# Patient Record
Sex: FEMALE | Race: WHITE | URBAN - METROPOLITAN AREA
[De-identification: names, ages, dates, MRNs, and addresses within clinical notes are randomized per-mention and may not be internally consistent; named-entity substitution may affect disease eponyms.]

---

## 2024-03-21 ENCOUNTER — OFFICE VISIT (OUTPATIENT)
Dept: OTOLARYNGOLOGY | Facility: CLINIC | Age: 49
End: 2024-03-21

## 2024-03-21 VITALS — BODY MASS INDEX: 27.38 KG/M2 | WEIGHT: 145 LBS | HEIGHT: 61 IN

## 2024-03-21 DIAGNOSIS — H92.12 OTORRHEA OF LEFT EAR: ICD-10-CM

## 2024-03-21 DIAGNOSIS — H90.12 CONDUCTIVE HEARING LOSS OF LEFT EAR WITH UNRESTRICTED HEARING OF RIGHT EAR: ICD-10-CM

## 2024-03-21 DIAGNOSIS — H60.392 OTHER INFECTIVE ACUTE OTITIS EXTERNA OF LEFT EAR: Primary | ICD-10-CM

## 2024-03-21 PROCEDURE — 99203 OFFICE O/P NEW LOW 30 MIN: CPT | Performed by: STUDENT IN AN ORGANIZED HEALTH CARE EDUCATION/TRAINING PROGRAM

## 2024-03-21 PROCEDURE — 3008F BODY MASS INDEX DOCD: CPT | Performed by: STUDENT IN AN ORGANIZED HEALTH CARE EDUCATION/TRAINING PROGRAM

## 2024-03-21 RX ORDER — LORATADINE 10 MG/1
10 TABLET ORAL DAILY
COMMUNITY
Start: 2024-01-15

## 2024-03-21 RX ORDER — ATORVASTATIN CALCIUM 20 MG/1
1 TABLET, FILM COATED ORAL DAILY
COMMUNITY
Start: 2023-02-15

## 2024-03-21 RX ORDER — SODIUM FLUORIDE 5 MG/G
CREAM DENTAL
COMMUNITY
Start: 2023-11-06

## 2024-03-21 RX ORDER — LANCETS 33 GAUGE
1 EACH MISCELLANEOUS 2 TIMES DAILY
COMMUNITY
Start: 2024-01-30

## 2024-03-21 RX ORDER — BLOOD SUGAR DIAGNOSTIC
STRIP MISCELLANEOUS 2 TIMES DAILY
COMMUNITY

## 2024-03-21 RX ORDER — DULAGLUTIDE 1.5 MG/.5ML
1.5 INJECTION, SOLUTION SUBCUTANEOUS WEEKLY
COMMUNITY

## 2024-03-21 RX ORDER — MONTELUKAST SODIUM 10 MG/1
10 TABLET ORAL DAILY
COMMUNITY
Start: 2024-01-11

## 2024-03-21 RX ORDER — CHOLECALCIFEROL (VITAMIN D3) 1250 MCG
50000 CAPSULE ORAL WEEKLY
COMMUNITY

## 2024-03-21 RX ORDER — TERCONAZOLE 80 MG/1
SUPPOSITORY VAGINAL
COMMUNITY
Start: 2023-11-10

## 2024-03-21 RX ORDER — BLOOD SUGAR DIAGNOSTIC
1 STRIP MISCELLANEOUS 2 TIMES DAILY
COMMUNITY

## 2024-03-21 RX ORDER — ESCITALOPRAM OXALATE 10 MG/1
1 TABLET ORAL DAILY
COMMUNITY
Start: 2023-02-15

## 2024-03-21 RX ORDER — SITAGLIPTIN 100 MG/1
TABLET, FILM COATED ORAL
COMMUNITY
Start: 2023-10-27

## 2024-03-21 RX ORDER — CIPROFLOXACIN AND DEXAMETHASONE 3; 1 MG/ML; MG/ML
4 SUSPENSION/ DROPS AURICULAR (OTIC) EVERY 12 HOURS
Qty: 7.5 ML | Refills: 0 | Status: SHIPPED | OUTPATIENT
Start: 2024-03-21 | End: 2024-03-28

## 2024-03-21 RX ORDER — SULFAMETHOXAZOLE AND TRIMETHOPRIM 800; 160 MG/1; MG/1
1 TABLET ORAL DAILY
COMMUNITY
Start: 2024-01-15

## 2024-03-21 RX ORDER — FLUCONAZOLE 150 MG/1
TABLET ORAL
COMMUNITY
Start: 2023-11-10

## 2024-03-21 RX ORDER — LOSARTAN POTASSIUM 25 MG/1
1 TABLET ORAL DAILY
COMMUNITY
Start: 2023-02-15

## 2024-03-21 NOTE — PROGRESS NOTES
[place]  OTOLARYNGOLOGY - HEAD & NECK SURGERY    3/21/2024     Reason for Consultation:   Left ear swelling, drainage for 1 month    History of Present Illness:   Patient is a pleasant 48 year old female who is being seen for left sided discomfort and drainage for 1 month. She has had this in the past and required cleaning without antibiotics.  She states this has been persistent over the last month and causes it to be severely itchy in the left ear canal.  He has not utilized any drops recently.  She does not endorse any significant pain but does note some discomfort.  She does have an impact of her hearing on the left side due to the fullness in her ear canal.  No history of ear surgery    Past Medical History  History reviewed. No pertinent past medical history.    Past Surgical History  History reviewed. No pertinent surgical history.    Family History  Family History   Problem Relation Age of Onset    High Blood Pressure Father     Diabetes Father        Social History  Pediatric History   Patient Parents    Not on file     Other Topics Concern    Not on file   Social History Narrative    Not on file           Current Medications:  Current Outpatient Medications   Medication Sig Dispense Refill    atorvastatin 20 MG Oral Tab Take 1 tablet (20 mg total) by mouth daily.      VITAMIN D3 1.25 MG (87480 UT) Oral Cap Take 1 capsule (50,000 Units total) by mouth once a week.      TRULICITY 1.5 MG/0.5ML Subcutaneous Solution Pen-injector Inject 1.5 mg into the skin once a week.      escitalopram 10 MG Oral Tab Take 1 tablet (10 mg total) by mouth daily.      fluconazole 150 MG Oral Tab TAKE 1 TABLET BY MOUTH STAT THEN 1 TABLET BY MOUTH IN TWO WEEKS      Glucose Blood (ONETOUCH VERIO) In Vitro Strip 1 strip by In Vitro route 2 (two) times daily.      Glucose Blood (ONETOUCH VERIO) In Vitro Strip 2 (two) times daily.      Lancets (ONETOUCH DELICA PLUS [id number]) Does not apply Misc 1 strip by In Vitro route 2 (two)  times daily.      loratadine 10 MG Oral Tab Take 1 tablet (10 mg total) by mouth daily.      losartan 25 MG Oral Tab Take 1 tablet (25 mg total) by mouth daily.      metFORMIN HCl 1000 MG Oral Tab       montelukast 10 MG Oral Tab Take 1 tablet (10 mg total) by mouth daily.      JANUVIA 100 MG Oral Tab       SODIUM FLUORIDE 5000 PLUS 1.1 % Dental Cream APPLY A SMALL AMOUNT TO TEETH EVERY NIGHT AS DIRECTED. DO NOT SWALLOW. DO NOT EAT OR DRINK FOR 30 MINUTES AFTER USE      sulfamethoxazole-trimethoprim -160 MG Oral Tab per tablet Take 1 tablet by mouth daily.      Terconazole 80 MG Vaginal Suppos          Allergies  No Known Allergies    Review of Systems:   A comprehensive 10 point review of systems was completed.  Pertinent positives and negatives noted in the the HPI.    Physical Exam:   Height 5' 1\" (1.549 m), weight 145 lb (65.8 kg).    GENERAL: No acute distress, Comfortable appearing  FACE: HB 1/6, Normal Animation  HEAD: Normocephalic  EYES: EOMI, pupils equil  EARS: Bilateral Auricles Symmetric   NOSE: Nares patent bilaterally  ORAL CAVITY: Tongue mobile, Oropharynx clear, Floor of mouth clear, Posterior oropharynx normal  NECK: No palpable lymphadenopathy, thyroid not palpable, nontender    Canals:  Right: Clear  Left: Significant edema of the lateral canal with some purulence    Tympanic Membranes:  Right: Normal tympanic membrane, with no retraction, middle ear space clear  Left: Unable to be visualized    TM Visualized Method:   Right TM examined via otomicroscopy.   Left TM unable to be visualized      Results:     Laboratory Data:  No results found for: \"WBC\", \"HGB\", \"HCT\", \"PLT\", \"CREATSERUM\", \"BUN\", \"NA\", \"K\", \"CL\", \"CO2\", \"GLU\", \"CA\", \"ALB\", \"ALKPHO\", \"TP\", \"AST\", \"ALT\", \"PTT\", \"INR\", \"PTP\", \"T4F\", \"TSH\", \"TSHREFLEX\", \"MARY\", \"LIP\", \"GGT\", \"PSA\", \"DDIMER\", \"ESRML\", \"ESRPF\", \"CRP\", \"BNP\", \"MG\", \"PHOS\", \"TROP\", \"CK\", \"CKMB\", \"MIKIE\", \"RPR\", \"B12\", \"ETOH\", \"POCGLU\"      Imaging:  No results  found.      Impression:   Left otitis externa, other infective  Left otorrhea  Left conductive hearing loss    Recommendations:  I have placed an otowick in the left ear along with ofloxacin eardrops  I will have her take Ciprodex eardrops twice a day over the next week  She will return in 1 week for removal of the wick and debridement of the canal    Thank you for allowing me to participate in the care of your patient.    Ramirez Cordova,    Otolaryngology/Rhinology, Sinus, and Endoscopic Skull Base Surgery  [place] [id number]  Phone [phone]  Fax [phone]  3/21/2024  10:19 AM  3/21/2024

## 2024-03-28 ENCOUNTER — OFFICE VISIT (OUTPATIENT)
Dept: OTOLARYNGOLOGY | Facility: CLINIC | Age: 49
End: 2024-03-28

## 2024-03-28 DIAGNOSIS — H92.12 OTORRHEA OF LEFT EAR: ICD-10-CM

## 2024-03-28 DIAGNOSIS — H60.392 OTHER INFECTIVE ACUTE OTITIS EXTERNA OF LEFT EAR: Primary | ICD-10-CM

## 2024-03-28 PROCEDURE — 99213 OFFICE O/P EST LOW 20 MIN: CPT | Performed by: STUDENT IN AN ORGANIZED HEALTH CARE EDUCATION/TRAINING PROGRAM

## 2024-03-28 PROCEDURE — 69210 REMOVE IMPACTED EAR WAX UNI: CPT | Performed by: STUDENT IN AN ORGANIZED HEALTH CARE EDUCATION/TRAINING PROGRAM

## 2024-03-28 NOTE — PROGRESS NOTES
[place]  OTOLARYNGOLOGY - HEAD & NECK SURGERY    3/28/2024     Reason for Consultation:   Left ear swelling, drainage for 1 month    History of Present Illness:   Patient is a pleasant 48 year old female who is being seen for left sided discomfort and drainage for 1 month. She has had this in the past and required cleaning without antibiotics.  She states this has been persistent over the last month and causes it to be severely itchy in the left ear canal.  He has not utilized any drops recently.  She does not endorse any significant pain but does note some discomfort.  She does have an impact of her hearing on the left side due to the fullness in her ear canal.  No history of ear surgery    INTERVAL HISTORY  3/28/2024:     Past Medical History  History reviewed. No pertinent past medical history.    Past Surgical History  History reviewed. No pertinent surgical history.    Family History  Family History   Problem Relation Age of Onset    High Blood Pressure Father     Diabetes Father        Social History  Pediatric History   Patient Parents    Not on file     Other Topics Concern    Not on file   Social History Narrative    Not on file           Current Medications:  Current Outpatient Medications   Medication Sig Dispense Refill    atorvastatin 20 MG Oral Tab Take 1 tablet (20 mg total) by mouth daily.      VITAMIN D3 1.25 MG (11351 UT) Oral Cap Take 1 capsule (50,000 Units total) by mouth once a week.      TRULICITY 1.5 MG/0.5ML Subcutaneous Solution Pen-injector Inject 1.5 mg into the skin once a week.      escitalopram 10 MG Oral Tab Take 1 tablet (10 mg total) by mouth daily.      fluconazole 150 MG Oral Tab TAKE 1 TABLET BY MOUTH STAT THEN 1 TABLET BY MOUTH IN TWO WEEKS      Glucose Blood (ONETOUCH VERIO) In Vitro Strip 1 strip by In Vitro route 2 (two) times daily.      Glucose Blood (ONETOUCH VERIO) In Vitro Strip 2 (two) times daily.      Lancets (ONETOUCH DELICA PLUS [id number]) Does not apply Misc 1  strip by In Vitro route 2 (two) times daily.      loratadine 10 MG Oral Tab Take 1 tablet (10 mg total) by mouth daily.      losartan 25 MG Oral Tab Take 1 tablet (25 mg total) by mouth daily.      metFORMIN HCl 1000 MG Oral Tab       montelukast 10 MG Oral Tab Take 1 tablet (10 mg total) by mouth daily.      JANUVIA 100 MG Oral Tab       SODIUM FLUORIDE 5000 PLUS 1.1 % Dental Cream APPLY A SMALL AMOUNT TO TEETH EVERY NIGHT AS DIRECTED. DO NOT SWALLOW. DO NOT EAT OR DRINK FOR 30 MINUTES AFTER USE      sulfamethoxazole-trimethoprim -160 MG Oral Tab per tablet Take 1 tablet by mouth daily.      Terconazole 80 MG Vaginal Suppos       ciprofloxacin-dexamethasone 0.3-0.1 % Otic Suspension Place 4 drops into the left ear every 12 (twelve) hours for 7 days. 7.5 mL 0       Allergies  No Known Allergies    Review of Systems:   A comprehensive 10 point review of systems was completed.  Pertinent positives and negatives noted in the the HPI.    Physical Exam:   There were no vitals taken for this visit.    GENERAL: No acute distress, Comfortable appearing  FACE: HB 1/6, Normal Animation  HEAD: Normocephalic  EYES: EOMI, pupils equil  EARS: Bilateral Auricles Symmetric   NOSE: Nares patent bilaterally  ORAL CAVITY: Tongue mobile, Oropharynx clear, Floor of mouth clear, Posterior oropharynx normal  NECK: No palpable lymphadenopathy, thyroid not palpable, nontender    Canals:  Right: Clear  Left: Debris and cerumen impaction removed using suction in order to visualize the tympanic membrane, there is resolution of the canal edema    Tympanic Membranes:  Right: Normal tympanic membrane, with no retraction, middle ear space clear  Left: Normal tympanic membrane, no retraction    TM Visualized Method:   Right TM examined via otomicroscopy.   Left TM examined via otomicroscopy      Results:     Laboratory Data:  No results found for: \"WBC\", \"HGB\", \"HCT\", \"PLT\", \"CREATSERUM\", \"BUN\", \"NA\", \"K\", \"CL\", \"CO2\", \"GLU\", \"CA\", \"ALB\",  \"ALKPHO\", \"TP\", \"AST\", \"ALT\", \"PTT\", \"INR\", \"PTP\", \"T4F\", \"TSH\", \"TSHREFLEX\", \"MARY\", \"LIP\", \"GGT\", \"PSA\", \"DDIMER\", \"ESRML\", \"ESRPF\", \"CRP\", \"BNP\", \"MG\", \"PHOS\", \"TROP\", \"CK\", \"CKMB\", \"MIKIE\", \"RPR\", \"B12\", \"ETOH\", \"POCGLU\"      Imaging:  No results found.      Impression:   Left otitis externa, other infective  Left otorrhea  Left conductive hearing loss    Recommendations:  Patient is much improved today in terms of her ear  She is able to hear normally  She will return to see me if there are any persistent issues    Thank you for allowing me to participate in the care of your patient.    Ramirez Cordova, DO   Otolaryngology/Rhinology, Sinus, and Endoscopic Skull Base Surgery  [facility]   [address] [place] [address] [address] [place] [id number]  Phone [phone]  Fax [phone]  3/21/2024  10:19 AM  3/28/2024

## 2024-09-30 ENCOUNTER — OFFICE VISIT (OUTPATIENT)
Dept: OTOLARYNGOLOGY | Facility: CLINIC | Age: 49
End: 2024-09-30

## 2024-09-30 VITALS — BODY MASS INDEX: 27.38 KG/M2 | WEIGHT: 145 LBS | HEIGHT: 61 IN

## 2024-09-30 DIAGNOSIS — H61.22 IMPACTED CERUMEN OF LEFT EAR: Primary | ICD-10-CM

## 2024-09-30 PROCEDURE — 69210 REMOVE IMPACTED EAR WAX UNI: CPT | Performed by: OTOLARYNGOLOGY

## 2024-09-30 PROCEDURE — 3008F BODY MASS INDEX DOCD: CPT | Performed by: OTOLARYNGOLOGY

## 2024-09-30 NOTE — PROGRESS NOTES
Kenisha Rivera is a 49 year old female.    Chief Complaint   Patient presents with    Ear Problem     Left ear still ear still very itchy, feels wet at times        HISTORY OF PRESENT ILLNESS    Patient presents for cerumen removal. No other complaints or concerns at this time    Social History     Socioeconomic History    Marital status:    Tobacco Use    Smoking status: Every Day     Types: Cigarettes    Smokeless tobacco: Never   Vaping Use    Vaping status: Every Day    Substances: Nicotine   Substance and Sexual Activity    Alcohol use: Yes    Drug use: Never       Family History   Problem Relation Age of Onset    High Blood Pressure Father     Diabetes Father        History reviewed. No pertinent past medical history.    History reviewed. No pertinent surgical history.    REVIEW OF SYSTEMS    System Neg/Pos Details   Constitutional Negative Fatigue, fever and weight loss.   ENMT Negative Drooling.   Eyes Negative Blurred vision and vision changes.   Respiratory Negative Dyspnea and wheezing.   Cardio Negative Chest pain, irregular heartbeat/palpitations and syncope.   GI Negative Abdominal pain and diarrhea.   Endocrine Negative Cold intolerance and heat intolerance.   Neuro Negative Tremors.   Psych Negative Anxiety and depression.   Integumentary Negative Frequent skin infections, pigment change and rash.   Hema/Lymph Negative Easy bleeding and easy bruising.           PHYSICAL EXAM    Ht 5' 1\" (1.549 m)   Wt 145 lb (65.8 kg)   BMI 27.40 kg/m²        Constitutional Normal Overall appearance - Normal.        Neck Exam Normal Inspection - Normal. Palpation - Normal. Parotid gland - Normal. Thyroid gland - Normal.             Head/Face Normal Facial features - Normal. Eyebrows - Normal. Skull - Normal.             Ears Normal Inspection - Right: Normal, Left: Normal. Canal - Right: Normal, Left: Normal. TM - Right: Normal, Left: Normal.   Skin Normal Inspection - Normal.                               Canals:  Right: Canal reveals  no cerumen impaction,   Left: Canal reveals cerumen impaction,     Tympanic Membranes:  Right: Normal tympanic membrane.   Left: Normal tympanic membrane.     TM Visualized Method:   Right TM examined via otomicroscopy.    Left TM examined via otomicroscopy.      PROCEDURE:    Removal of cerumen impaction   The cerumen impaction was completely removed using microscopy.   Removal was completed by using acurette and/or suction.   Comments: Return to clinic as needed.  Avoid q-tips, water precautions and use over the counter wax remedies as needed.      Current Outpatient Medications:     atorvastatin 20 MG Oral Tab, Take 1 tablet (20 mg total) by mouth daily., Disp: , Rfl:     TRULICITY 1.5 MG/0.5ML Subcutaneous Solution Pen-injector, Inject 1.5 mg into the skin once a week., Disp: , Rfl:     escitalopram 10 MG Oral Tab, Take 1 tablet (10 mg total) by mouth daily., Disp: , Rfl:     Glucose Blood (ONETOUCH VERIO) In Vitro Strip, 1 strip by In Vitro route 2 (two) times daily., Disp: , Rfl:     Glucose Blood (ONETOUCH VERIO) In Vitro Strip, 2 (two) times daily., Disp: , Rfl:     Lancets (ONETOUCH DELICA PLUS [id number]) Does not apply Misc, 1 strip by In Vitro route 2 (two) times daily., Disp: , Rfl:     loratadine 10 MG Oral Tab, Take 1 tablet (10 mg total) by mouth daily., Disp: , Rfl:     losartan 25 MG Oral Tab, Take 1 tablet (25 mg total) by mouth daily., Disp: , Rfl:     metFORMIN HCl 1000 MG Oral Tab, , Disp: , Rfl:     montelukast 10 MG Oral Tab, Take 1 tablet (10 mg total) by mouth daily., Disp: , Rfl:     JANUVIA 100 MG Oral Tab, , Disp: , Rfl:     SODIUM FLUORIDE 5000 PLUS 1.1 % Dental Cream, APPLY A SMALL AMOUNT TO TEETH EVERY NIGHT AS DIRECTED. DO NOT SWALLOW. DO NOT EAT OR DRINK FOR 30 MINUTES AFTER USE, Disp: , Rfl:     VITAMIN D3 1.25 MG (62667 UT) Oral Cap, Take 1 capsule (50,000 Units total) by mouth once a week. (Patient not taking: Reported on 9/30/2024), Disp: , Rfl:      fluconazole 150 MG Oral Tab, TAKE 1 TABLET BY MOUTH STAT THEN 1 TABLET BY MOUTH IN TWO WEEKS (Patient not taking: Reported on 9/30/2024), Disp: , Rfl:     sulfamethoxazole-trimethoprim -160 MG Oral Tab per tablet, Take 1 tablet by mouth daily. (Patient not taking: Reported on 9/30/2024), Disp: , Rfl:     Terconazole 80 MG Vaginal Suppos, , Disp: , Rfl:   ASSESSMENT AND PLAN    1. Impacted cerumen of left ear    - REMOVAL IMPACTED CERUMEN REQUIRING INSTRUMENTATION, UNILATERAL      All cerumen was removed using microscopy. I have asked the patient to return to see me as needed for repeat cerumen removal in the future.      Doc Kothari MD    9/30/2024    5:40 PM